# Patient Record
Sex: FEMALE | Race: BLACK OR AFRICAN AMERICAN | Employment: FULL TIME | URBAN - METROPOLITAN AREA
[De-identification: names, ages, dates, MRNs, and addresses within clinical notes are randomized per-mention and may not be internally consistent; named-entity substitution may affect disease eponyms.]

---

## 2018-09-04 ENCOUNTER — OFFICE VISIT (OUTPATIENT)
Dept: FAMILY MEDICINE CLINIC | Age: 33
End: 2018-09-04

## 2018-09-04 VITALS
HEIGHT: 67 IN | SYSTOLIC BLOOD PRESSURE: 102 MMHG | OXYGEN SATURATION: 100 % | HEART RATE: 66 BPM | DIASTOLIC BLOOD PRESSURE: 65 MMHG | RESPIRATION RATE: 18 BRPM | TEMPERATURE: 97 F | WEIGHT: 145.6 LBS | BODY MASS INDEX: 22.85 KG/M2

## 2018-09-04 DIAGNOSIS — Z11.4 SCREENING FOR HIV (HUMAN IMMUNODEFICIENCY VIRUS): ICD-10-CM

## 2018-09-04 DIAGNOSIS — R53.83 FATIGUE, UNSPECIFIED TYPE: ICD-10-CM

## 2018-09-04 DIAGNOSIS — Z76.89 ENCOUNTER TO ESTABLISH CARE: Primary | ICD-10-CM

## 2018-09-04 DIAGNOSIS — R68.82 DECREASED LIBIDO: ICD-10-CM

## 2018-09-04 DIAGNOSIS — Z11.3 SCREEN FOR STD (SEXUALLY TRANSMITTED DISEASE): ICD-10-CM

## 2018-09-04 DIAGNOSIS — M25.562 PAIN IN BOTH KNEES, UNSPECIFIED CHRONICITY: ICD-10-CM

## 2018-09-04 DIAGNOSIS — Z82.49 FAMILY HISTORY OF CARDIOMYOPATHY: ICD-10-CM

## 2018-09-04 DIAGNOSIS — M25.561 PAIN IN BOTH KNEES, UNSPECIFIED CHRONICITY: ICD-10-CM

## 2018-09-04 DIAGNOSIS — R00.2 PALPITATIONS: ICD-10-CM

## 2018-09-04 LAB
BILIRUB UR QL STRIP: NEGATIVE
GLUCOSE UR-MCNC: NEGATIVE MG/DL
HBA1C MFR BLD HPLC: 5.4 %
KETONES P FAST UR STRIP-MCNC: NEGATIVE MG/DL
PH UR STRIP: 5.5 [PH] (ref 4.6–8)
PROT UR QL STRIP: NEGATIVE
SP GR UR STRIP: 1.03 (ref 1–1.03)
UA UROBILINOGEN AMB POC: NORMAL (ref 0.2–1)
URINALYSIS CLARITY POC: CLEAR
URINALYSIS COLOR POC: YELLOW
URINE BLOOD POC: NEGATIVE
URINE LEUKOCYTES POC: NEGATIVE
URINE NITRITES POC: NEGATIVE

## 2018-09-04 NOTE — PROGRESS NOTES
Chief Complaint Patient presents with [address] [place] [address] New Patient 1. Have you been to the ER, urgent care clinic since your last visit? Hospitalized since your last visit? No 
 
2. Have you seen or consulted any other health care providers outside of the [address] [place] [address] since your last visit? Include any pap smears or colon screening. No  
 
Patient states she has been very tired lately, with no sex drive and thinning hair. Health Maintenance Due Topic Date Due  
 DTaP/Tdap/Td series (1 - Tdap) 01/05/2008  PAP AKA CERVICAL CYTOLOGY  01/05/2008  Influenza Age 5 to Adult  08/01/2018

## 2018-09-04 NOTE — MR AVS SNAPSHOT
[facility] 
 
 
 [address] [facility] [id number] [phone] Patient: Jeovanny Found MRN: [id number] NPH:[birth year] Visit Information Date & Time Provider Department Dept. Phone Encounter #  
 9/4/2018  9:30 AM Barney MoralesDrew [phone] [id number] Follow-up Instructions Return in about 1 year (around 9/4/2019), or if symptoms worsen or fail to improve. Upcoming Health Maintenance Date Due DTaP/Tdap/Td series (1 - Tdap) 1/5/2008 PAP AKA CERVICAL CYTOLOGY 1/5/2008 Influenza Age 5 to Adult 8/1/2018 Allergies as of 9/4/2018  Review Complete On: 9/4/2018 By: Barney oMrales NP No Known Allergies Current Immunizations  Never Reviewed No immunizations on file. Not reviewed this visit You Were Diagnosed With   
  
 Codes Comments Encounter to establish care    -  Primary ICD-10-CM: Z76.89 
ICD-9-CM: V65.8 Fatigue, unspecified type     ICD-10-CM: R53.83 ICD-9-CM: 780.79 Palpitations     ICD-10-CM: R00.2 ICD-9-CM: 785.1 Family history of cardiomyopathy     ICD-10-CM: Z82.49 
ICD-9-CM: V17.49 Decreased libido     ICD-10-CM: F35.50 
ICD-9-CM: 799.81 Pain in both knees, unspecified chronicity     ICD-10-CM: M25.561, M25.562 ICD-9-CM: 719.46 Screening for HIV (human immunodeficiency virus)     ICD-10-CM: Z11.4 ICD-9-CM: V73.89 Screen for STD (sexually transmitted disease)     ICD-10-CM: Z11.3 ICD-9-CM: V74.5 Vitals BP Pulse Temp Resp Height(growth percentile) Weight(growth percentile) 102/65 (BP 1 Location: Right arm, BP Patient Position: Sitting) 66 97 °F (36.1 °C) (Oral) 18 5' 7\" (1.702 m) 145 lb 9.6 oz (66 kg) LMP SpO2 BMI Smoking Status 08/15/2018 (Approximate) 100% 22.8 kg/m2 Never Smoker BMI and BSA Data Body Mass Index Body Surface Area  
 22.8 kg/m 2 1.77 m 2 Your Updated Medication List  
  
 Notice  As of 9/4/2018 10:26 AM  
 You have not been prescribed any medications. We Performed the Following AMB POC EKG ROUTINE W/ 12 LEADS, INTER & REP [28051 CPT(R)] AMB POC HEMOGLOBIN A1C [45952 CPT(R)] AMB POC URINALYSIS DIP STICK AUTO W/O MICRO [49769 CPT(R)] ANEMIA PROFILE B [id number] CPT(R)] CHLAMYDIA/GC PCR [23638 CPT(R)] HIV 1/2 AG/AB, 4TH GENERATION,W RFLX CONFIRM [id number] CPT(R)] LIPID PANEL [84880 CPT(R)] MAGNESIUM [id number] CPT(R)] METABOLIC PANEL, COMPREHENSIVE [62804 CPT(R)] TESTOSTERONE, TOTAL, FEMALE/CHILD [id number] CPT(R)] THYROID PANEL W/TSH [63862 CPT(R)] VITAMIN D, 25 HYDROXY [id number] CPT(R)] Follow-up Instructions Return in about 1 year (around 9/4/2019), or if symptoms worsen or fail to improve. Patient Instructions Fatigue: Care Instructions Your Care Instructions Fatigue is a feeling of tiredness, exhaustion, or lack of energy. You may feel fatigue because of too much or not enough activity. It can also come from stress, lack of sleep, boredom, and poor diet. Many medical problems, such as viral infections, can cause fatigue. Emotional problems, especially depression, are often the cause of fatigue. Fatigue is most often a symptom of another problem. Treatment for fatigue depends on the cause. For example, if you have fatigue because you have a certain health problem, treating this problem also treats your fatigue. If depression or anxiety is the cause, treatment may help. Follow-up care is a key part of your treatment and safety. Be sure to make and go to all appointments, and call your doctor if you are having problems. It's also a good idea to know your test results and keep a list of the medicines you take. How can you care for yourself at home? · Get regular exercise. But don't overdo it. Go back and forth between rest and exercise.  
· Get plenty of rest. 
 · Eat a healthy diet. Do not skip meals, especially breakfast. 
· Reduce your use of caffeine, tobacco, and alcohol. Caffeine is most often found in coffee, tea, cola drinks, and chocolate. · Limit medicines that can cause fatigue. This includes tranquilizers and cold and allergy medicines. When should you call for help? Watch closely for changes in your health, and be sure to contact your doctor if: 
  · You have new symptoms such as fever or a rash.  
  · Your fatigue gets worse.  
  · You have been feeling down, depressed, or hopeless. Or you may have lost interest in things that you usually enjoy.  
  · You are not getting better as expected. Where can you learn more? Go to http://demetria-jigna.info/. Enter Y577 in the search box to learn more about \"Fatigue: Care Instructions. \" Current as of: November 20, 2017 Content Version: 11.7 © 4424-0245 [employer]. Care instructions adapted under license by [employer] (which disclaims liability or warranty for this information). If you have questions about a medical condition or this instruction, always ask your healthcare professional. [place] [address] any warranty or liability for your use of this information. Decreased Libido in Women: Care Instructions Your Care Instructions A decreased libido means you have less desire to have sex. It may be hard for you to get sexually excited or have an orgasm. This is a common problem for women. Many things can cause this problem. And for a lot of women, there is more than one cause. In some cases, a woman's sex life is affected by normal changes in her body. These include having a baby and menopause. This problem can also be caused by a medicine you take. Or your vagina could be dry. Or you might have a vaginal infection. And sometimes, there are issues between you and your partner that affect your sex drive. Many women can have a healthy sex drive again after the problem is found. Your doctor may do tests to see if you have a vaginal infection. He or she may ask you questions about your sex life. For treatment to work well, it is important to trust your doctor. Be honest about your feelings toward sex. Your sex partner may want to be involved with your treatment. If you take medicine for depression, ask your doctor about changing to a medicine that does not affect sexual desire. Follow-up care is a key part of your treatment and safety. Be sure to make and go to all appointments, and call your doctor if you are having problems. It's also a good idea to know your test results and keep a list of the medicines you take. How can you care for yourself at home? · Tell your doctor about all the medicines you take. This includes vitamins, supplements, and herbal remedies. · Use vaginal lubricant during sex. Examples are Astroglide, K-Y Jelly, and Wet Gel Lubricant. · Increase the time you and your partner spend touching each other before sex. This is called foreplay. · Before sex, take a warm bath. This can relax you and reduce stress or anxiety. · Enjoy other types of sexual activity, not just intercourse. · Be honest with your sex partner about what you enjoy during sex. Where can you learn more? Go to http://demetria-jigna.info/. Enter P908 in the search box to learn more about \"Decreased Libido in Women: Care Instructions. \" Current as of: October 6, 2017 Content Version: 11.7 © 4017-1285 [employer]. Care instructions adapted under license by [employer] (which disclaims liability or warranty for this information). If you have questions about a medical condition or this instruction, always ask your healthcare professional. [place] [address] any warranty or liability for your use of this information. Introducing [facility] SERVICES! Pascale Morley introduces TiVo patient portal. Now you can access parts of your medical record, email your doctor's office, and request medication refills online. 1. In your internet browser, go to [email or web address] 2. Click on the First Time User? Click Here link in the Sign In box. You will see the New Member Sign Up page. 3. Enter your TiVo Access Code exactly as it appears below. You will not need to use this code after youve completed the sign-up process. If you do not sign up before the expiration date, you must request a new code. · TiVo Access Code: [email or web address] Expires: 12/3/2018 10:25 AM 
 
4. Enter the last four digits of your Social Security Number (xxxx) and Date of Birth (mm/dd/yyyy) as indicated and click Submit. You will be taken to the next sign-up page. 5. Create a TiVo ID. This will be your TiVo login ID and cannot be changed, so think of one that is secure and easy to remember. 6. Create a TiVo password. You can change your password at any time. 7. Enter your Password Reset Question and Answer. This can be used at a later time if you forget your password. 8. Enter your e-mail address. You will receive e-mail notification when new information is available in [address] 9. Click Sign Up. You can now view and download portions of your medical record. 10. Click the Download Summary menu link to download a portable copy of your medical information. If you have questions, please visit the Frequently Asked Questions section of the TiVo website. Remember, TiVo is NOT to be used for urgent needs. For medical emergencies, dial 911. Now available from your iPhone and Android! Please provide this summary of care documentation to your next provider. Your primary care clinician is listed as NOT ON FILE. If you have any questions after today's visit, please call [phone].

## 2018-09-04 NOTE — PATIENT INSTRUCTIONS
Fatigue: Care Instructions Your Care Instructions Fatigue is a feeling of tiredness, exhaustion, or lack of energy. You may feel fatigue because of too much or not enough activity. It can also come from stress, lack of sleep, boredom, and poor diet. Many medical problems, such as viral infections, can cause fatigue. Emotional problems, especially depression, are often the cause of fatigue. Fatigue is most often a symptom of another problem. Treatment for fatigue depends on the cause. For example, if you have fatigue because you have a certain health problem, treating this problem also treats your fatigue. If depression or anxiety is the cause, treatment may help. Follow-up care is a key part of your treatment and safety. Be sure to make and go to all appointments, and call your doctor if you are having problems. It's also a good idea to know your test results and keep a list of the medicines you take. How can you care for yourself at home? · Get regular exercise. But don't overdo it. Go back and forth between rest and exercise. · Get plenty of rest. 
· Eat a healthy diet. Do not skip meals, especially breakfast. 
· Reduce your use of caffeine, tobacco, and alcohol. Caffeine is most often found in coffee, tea, cola drinks, and chocolate. · Limit medicines that can cause fatigue. This includes tranquilizers and cold and allergy medicines. When should you call for help? Watch closely for changes in your health, and be sure to contact your doctor if: 
  · You have new symptoms such as fever or a rash.  
  · Your fatigue gets worse.  
  · You have been feeling down, depressed, or hopeless. Or you may have lost interest in things that you usually enjoy.  
  · You are not getting better as expected. Where can you learn more? Go to http://demetria-jigna.info/. Enter U250 in the search box to learn more about \"Fatigue: Care Instructions. \" Current as of: November 20, 2017 Content Version: 11.7 © 9008-2836 [employer]. Care instructions adapted under license by [employer] (which disclaims liability or warranty for this information). If you have questions about a medical condition or this instruction, always ask your healthcare professional. [address] any warranty or liability for your use of this information. Decreased Libido in Women: Care Instructions Your Care Instructions A decreased libido means you have less desire to have sex. It may be hard for you to get sexually excited or have an orgasm. This is a common problem for women. Many things can cause this problem. And for a lot of women, there is more than one cause. In some cases, a woman's sex life is affected by normal changes in her body. These include having a baby and menopause. This problem can also be caused by a medicine you take. Or your vagina could be dry. Or you might have a vaginal infection. And sometimes, there are issues between you and your partner that affect your sex drive. Many women can have a healthy sex drive again after the problem is found. Your doctor may do tests to see if you have a vaginal infection. He or she may ask you questions about your sex life. For treatment to work well, it is important to trust your doctor. Be honest about your feelings toward sex. Your sex partner may want to be involved with your treatment. If you take medicine for depression, ask your doctor about changing to a medicine that does not affect sexual desire. Follow-up care is a key part of your treatment and safety. Be sure to make and go to all appointments, and call your doctor if you are having problems. It's also a good idea to know your test results and keep a list of the medicines you take. How can you care for yourself at home? · Tell your doctor about all the medicines you take. This includes vitamins, supplements, and herbal remedies. · Use vaginal lubricant during sex. Examples are Astroglide, K-Y Jelly, and Wet Gel Lubricant. · Increase the time you and your partner spend touching each other before sex. This is called foreplay. · Before sex, take a warm bath. This can relax you and reduce stress or anxiety. · Enjoy other types of sexual activity, not just intercourse. · Be honest with your sex partner about what you enjoy during sex. Where can you learn more? Go to http://demetria-jigna.info/. Enter T272 in the search box to learn more about \"Decreased Libido in Women: Care Instructions. \" Current as of: October 6, 2017 Content Version: 11.7 © 9411-8106 [employer]. Care instructions adapted under license by [employer] (which disclaims liability or warranty for this information). If you have questions about a medical condition or this instruction, always ask your healthcare professional. [address] any warranty or liability for your use of this information.

## 2018-09-04 NOTE — PROGRESS NOTES
HISTORY OF PRESENT ILLNESS 
Haydee Moss is a 34 y.o. female. HPI  Patient comes in today to establish care. Previous PCP - none. GYN - Dr. Nenita De La Cruz. Last visit in 2016. No abnormal pap. 2 children, ages 8y son and 7y daughter Patient states she feels very tired, more than she should be for her age, thinning hair, no sex drive, even in her 19G. Still having menses, regular. States she sleeps well at night, about 6 hours nightly, states normal for her, may feel better if she gets 7 hours. Tries to drink water, maybe 2 bottles daily. Tries to eat healthy, but does eat out couple days weekly. States she has felt palpitations in past.  States she has had hand tremors in past, sometimes thinks related to coffee intake. Dry skin and hair loss. Denies weight changes, chest pains, heat or cold intolerance, constipation or diarrhea, brittle nails, fatigue. Denies any lumps in neck, neck pain. No FH of thyroid disease. Complains of bilateral knee pain, will feel a sharp pain in knee when she is climbing steps, bending knees, squats. Once she starts getting up in number of steps or squats, she will feel it. No injury to knees past or present. States she changed shoes without any improvement. States pain only last for quick second, then goes away on own. No Known Allergies Past Medical History:  
Diagnosis Date  Family history of cardiomyopathy 9/4/2018 Past Surgical History:  
Procedure Laterality Date  HX BREAST AUGMENTATION  2016  HX GYN    
 D&C Social History Social History  Marital status: SINGLE Spouse name: N/A  
 Number of children: N/A  
 Years of education: N/A Occupational History  Not on file. Social History Main Topics  Smoking status: Never Smoker  Smokeless tobacco: Never Used  Alcohol use Yes Comment: OCC  Drug use: No  
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  2 children, ages 8y son and 7y daughter. Single Family History Problem Relation Age of Onset  Pacemaker Mother  Hypertension Mother  Heart Disease Mother   
  hypertensive cardiomyopathy  Heart Attack Father  No Known Problems Brother  No Known Problems Brother No current outpatient prescriptions on file. No current facility-administered medications for this visit. Review of Systems Constitutional: Positive for malaise/fatigue. Negative for chills, diaphoresis, fever and weight loss. HENT: Negative for congestion, ear pain, sore throat and tinnitus. Eyes: Negative for blurred vision and double vision. Respiratory: Negative for cough, sputum production, shortness of breath and wheezing. Cardiovascular: Negative for chest pain, palpitations and leg swelling. Gastrointestinal: Negative for abdominal pain, blood in stool, constipation, diarrhea, nausea and vomiting. Genitourinary: Negative for dysuria, flank pain, frequency, hematuria and urgency. Musculoskeletal: Negative for back pain, joint pain and myalgias. Skin: Negative. Neurological: Negative for dizziness, tingling, sensory change, speech change, focal weakness and headaches. Psychiatric/Behavioral: Negative for depression. The patient is not nervous/anxious and does not have insomnia. Vitals:  
 09/04/18 6768 BP: 102/65 Pulse: 66 Resp: 18 Temp: 97 °F (36.1 °C) TempSrc: Oral  
SpO2: 100% Weight: 145 lb 9.6 oz (66 kg) Height: 5' 7\" (1.702 m) Physical Exam  
Constitutional: She is oriented to person, place, and time. Vital signs are normal. She appears well-developed and well-nourished. She is cooperative.   
HENT:  
Right Ear: Hearing, tympanic membrane, external ear and ear canal normal.  
 Left Ear: Hearing, tympanic membrane, external ear and ear canal normal.  
Nose: Nose normal. Right sinus exhibits no maxillary sinus tenderness and no frontal sinus tenderness. Left sinus exhibits no maxillary sinus tenderness and no frontal sinus tenderness. Mouth/Throat: Uvula is midline, oropharynx is clear and moist and mucous membranes are normal. Mucous membranes are not pale and not dry. No oropharyngeal exudate, posterior oropharyngeal edema or posterior oropharyngeal erythema. Neck: No thyroid mass and no thyromegaly present. Cardiovascular: Normal rate, regular rhythm, S1 normal, S2 normal and normal heart sounds. No murmur heard. Pulses: 
     Radial pulses are 2+ on the right side, and 2+ on the left side. Dorsalis pedis pulses are 2+ on the right side, and 2+ on the left side. Posterior tibial pulses are 2+ on the right side, and 2+ on the left side. Pulmonary/Chest: Effort normal and breath sounds normal. She has no decreased breath sounds. She has no wheezes. She has no rhonchi. She has no rales. Abdominal: Soft. Normal appearance and bowel sounds are normal. There is no hepatosplenomegaly. There is no tenderness. There is no CVA tenderness. Lymphadenopathy:  
     Head (right side): No submental, no submandibular, no tonsillar, no preauricular and no posterior auricular adenopathy present. Head (left side): No submental, no submandibular, no tonsillar, no preauricular and no posterior auricular adenopathy present. She has no cervical adenopathy. Right: No supraclavicular adenopathy present. Left: No supraclavicular adenopathy present. Neurological: She is alert and oriented to person, place, and time. Skin: Skin is warm, dry and intact. Psychiatric: She has a normal mood and affect. Her speech is normal and behavior is normal. Thought content normal.  
Vitals reviewed. ASSESSMENT and PLAN 
  ICD-10-CM ICD-9-CM 1. Encounter to establish care Z76.89 V65.8 2. Fatigue, unspecified type R53.83 780.79 ANEMIA PROFILE B  
   METABOLIC PANEL, COMPREHENSIVE  
   VITAMIN D, 25 HYDROXY  
   AMB POC HEMOGLOBIN A1C TESTOSTERONE, TOTAL, FEMALE/CHILD THYROID PANEL W/TSH AMB POC URINALYSIS DIP STICK AUTO W/O MICRO 3. Palpitations R00.2 785.1 LIPID PANEL  
   AMB POC EKG ROUTINE W/ 12 LEADS, INTER & REP  
   MAGNESIUM  
   THYROID PANEL W/TSH  
4. Family history of cardiomyopathy Z82.49 V17.49 LIPID PANEL  
   AMB POC EKG ROUTINE W/ 12 LEADS, INTER & REP 5. Decreased libido R68.82 799.81 TESTOSTERONE, TOTAL, FEMALE/CHILD 6. Pain in both knees, unspecified chronicity M25.561 719.46   
 M25.562    
7. Screening for HIV (human immunodeficiency virus) Z11.4 V73.89 HIV 1/2 AG/AB, 4TH GENERATION,W RFLX CONFIRM 8. Screen for STD (sexually transmitted disease) Z11.3 V74.5 CHLAMYDIA/GC PCR Encounter Diagnoses Name Primary?  Encounter to establish care Yes  Fatigue, unspecified type  Palpitations  Family history of cardiomyopathy  Decreased libido  Pain in both knees, unspecified chronicity  Screening for HIV (human immunodeficiency virus)  Screen for STD (sexually transmitted disease) Orders Placed This Encounter  CHLAMYDIA/GC PCR  
 ANEMIA PROFILE B  
 METABOLIC PANEL, COMPREHENSIVE  VITAMIN D, 25 HYDROXY  
 LIPID PANEL  
 TESTOSTERONE, TOTAL, FEMALE/CHILD  
 MAGNESIUM  
 THYROID PANEL W/TSH  HIV 1/2 AG/AB, 4TH GENERATION,W RFLX CONFIRM  
 AMB POC HEMOGLOBIN A1C  
 AMB POC URINALYSIS DIP STICK AUTO W/O MICRO  AMB POC EKG ROUTINE W/ 12 LEADS, INTER & REP Diagnoses and all orders for this visit: 1. Encounter to establish care 2. Fatigue, unspecified type -     ANEMIA PROFILE B 
-     METABOLIC PANEL, COMPREHENSIVE 
-     VITAMIN D, 25 HYDROXY 
-     AMB POC HEMOGLOBIN A1C 
-     TESTOSTERONE, TOTAL, FEMALE/CHILD 
-     THYROID PANEL W/TSH 
 -     AMB POC URINALYSIS DIP STICK AUTO W/O MICRO 3. Palpitations -     LIPID PANEL 
-     AMB POC EKG ROUTINE W/ 12 LEADS, INTER & REP 
-     MAGNESIUM 
-     THYROID PANEL W/TSH 
 
4. Family history of cardiomyopathy 
-     LIPID PANEL 
-     AMB POC EKG ROUTINE W/ 12 LEADS, INTER & REP 5. Decreased libido - patient is seeing someone at [facility] clinic. Needed to establish with PCP and have labs prior to returning. 
-     TESTOSTERONE, TOTAL, FEMALE/CHILD 6. Pain in both knees, unspecified chronicity - patient encouraged to perform warm up exercises prior to using stairs, squats. If continues, will need to check xray. Does not have pain on any other occasion other than steps/squats 7. Screening for HIV (human immunodeficiency virus) 
-     HIV 1/2 AG/AB, 4TH GENERATION,W RFLX CONFIRM 8. Screen for STD (sexually transmitted disease) -     CHLAMYDIA/GC PCR Follow-up Disposition: Not on File 
lab results and schedule of future lab studies reviewed with patient I have reviewed the patient's allergies and made any necessary changes. Medical, procedural, social and family histories have been reviewed and updated as medically indicated. I have reconciled and/or revised patient medications in the EMR. I have discussed each diagnosis listed in this note with Abby Browning and/or their family. I have discussed treatment options and the risk/benefit analysis of those options, including safe use of medications and possible medication side effects. Through the use of shared decision making we have agreed to the above plan. The patient has received an after-visit summary and questions were answered concerning future plans. Yara Kaur, FNP-C This note will not be viewable in [address]

## 2018-09-05 LAB
C TRACH RRNA SPEC QL NAA+PROBE: NEGATIVE
HIV 1+2 AB+HIV1 P24 AG SERPL QL IA: NON REACTIVE
N GONORRHOEA RRNA SPEC QL NAA+PROBE: NEGATIVE

## 2018-09-07 LAB
25(OH)D3+25(OH)D2 SERPL-MCNC: 18.5 NG/ML (ref 30–100)
ALBUMIN SERPL-MCNC: 4.4 G/DL (ref 3.5–5.5)
ALBUMIN/GLOB SERPL: 1.4 {RATIO} (ref 1.2–2.2)
ALP SERPL-CCNC: 62 IU/L (ref 39–117)
ALT SERPL-CCNC: 9 IU/L (ref 0–32)
AST SERPL-CCNC: 16 IU/L (ref 0–40)
BASOPHILS # BLD AUTO: 0.1 X10E3/UL (ref 0–0.2)
BASOPHILS NFR BLD AUTO: 1 %
BILIRUB SERPL-MCNC: 0.4 MG/DL (ref 0–1.2)
BUN SERPL-MCNC: 12 MG/DL (ref 6–20)
BUN/CREAT SERPL: 16 (ref 9–23)
CALCIUM SERPL-MCNC: 9.1 MG/DL (ref 8.7–10.2)
CHLORIDE SERPL-SCNC: 104 MMOL/L (ref 96–106)
CHOLEST SERPL-MCNC: 210 MG/DL (ref 100–199)
CO2 SERPL-SCNC: 21 MMOL/L (ref 20–29)
CREAT SERPL-MCNC: 0.74 MG/DL (ref 0.57–1)
EOSINOPHIL # BLD AUTO: 0.2 X10E3/UL (ref 0–0.4)
EOSINOPHIL NFR BLD AUTO: 3 %
ERYTHROCYTE [DISTWIDTH] IN BLOOD BY AUTOMATED COUNT: 14.9 % (ref 12.3–15.4)
FERRITIN SERPL-MCNC: 47 NG/ML (ref 15–150)
FOLATE SERPL-MCNC: 12 NG/ML
FT4I SERPL CALC-MCNC: 1.8 (ref 1.2–4.9)
GLOBULIN SER CALC-MCNC: 3.2 G/DL (ref 1.5–4.5)
GLUCOSE SERPL-MCNC: 86 MG/DL (ref 65–99)
HCT VFR BLD AUTO: 35.8 % (ref 34–46.6)
HDLC SERPL-MCNC: 75 MG/DL
HGB BLD-MCNC: 11.6 G/DL (ref 11.1–15.9)
IMM GRANULOCYTES # BLD: 0 X10E3/UL (ref 0–0.1)
IMM GRANULOCYTES NFR BLD: 0 %
INTERPRETATION, 910389: NORMAL
IRON SATN MFR SERPL: 23 % (ref 15–55)
IRON SERPL-MCNC: 66 UG/DL (ref 27–159)
LDLC SERPL CALC-MCNC: 121 MG/DL (ref 0–99)
LYMPHOCYTES # BLD AUTO: 2.5 X10E3/UL (ref 0.7–3.1)
LYMPHOCYTES NFR BLD AUTO: 44 %
MAGNESIUM SERPL-MCNC: 2.1 MG/DL (ref 1.6–2.3)
MCH RBC QN AUTO: 26.8 PG (ref 26.6–33)
MCHC RBC AUTO-ENTMCNC: 32.4 G/DL (ref 31.5–35.7)
MCV RBC AUTO: 83 FL (ref 79–97)
MONOCYTES # BLD AUTO: 0.4 X10E3/UL (ref 0.1–0.9)
MONOCYTES NFR BLD AUTO: 7 %
NEUTROPHILS # BLD AUTO: 2.5 X10E3/UL (ref 1.4–7)
NEUTROPHILS NFR BLD AUTO: 45 %
PLATELET # BLD AUTO: 560 X10E3/UL (ref 150–379)
POTASSIUM SERPL-SCNC: 4.5 MMOL/L (ref 3.5–5.2)
PROT SERPL-MCNC: 7.6 G/DL (ref 6–8.5)
RBC # BLD AUTO: 4.33 X10E6/UL (ref 3.77–5.28)
RETICS/RBC NFR AUTO: 0.7 % (ref 0.6–2.6)
SODIUM SERPL-SCNC: 139 MMOL/L (ref 134–144)
T3RU NFR SERPL: 25 % (ref 24–39)
T4 SERPL-MCNC: 7.3 UG/DL (ref 4.5–12)
TESTOST SERPL-MCNC: 38.4 NG/DL (ref 10–55)
TIBC SERPL-MCNC: 284 UG/DL (ref 250–450)
TRIGL SERPL-MCNC: 68 MG/DL (ref 0–149)
TSH SERPL DL<=0.005 MIU/L-ACNC: 2.56 UIU/ML (ref 0.45–4.5)
UIBC SERPL-MCNC: 218 UG/DL (ref 131–425)
VIT B12 SERPL-MCNC: 590 PG/ML (ref 232–1245)
VLDLC SERPL CALC-MCNC: 14 MG/DL (ref 5–40)
WBC # BLD AUTO: 5.6 X10E3/UL (ref 3.4–10.8)

## 2018-09-25 DIAGNOSIS — E55.9 VITAMIN D DEFICIENCY: Primary | ICD-10-CM

## 2018-09-25 RX ORDER — ERGOCALCIFEROL 1.25 MG/1
50000 CAPSULE ORAL
Qty: 4 CAP | Refills: 2 | Status: SHIPPED | OUTPATIENT
Start: 2018-09-25 | End: 2021-03-15

## 2018-09-25 NOTE — PROGRESS NOTES
RECOMMENDATIONS: 
Your platelets are elevated. Platelets are blood particles produced in the bone marrow that play an important role in the process of forming blood clots. Thrombocytosis is a disorder in which your body produces too many platelets. This can be caused by an underlying condition, such as infection, or less commonly, it has no apparent underlying condition as a cause. People with thrombocytosis often don't have signs or symptoms. People with essential thrombocythemia might have signs and symptoms related to blood clots and bleeding, including: 
Headache Dizziness or lightheadedness Chest pain Weakness Numbness or tingling of the hands and feet I would like to repeat the lab and send your blood off to the pathologist to look under microscope for abnormal cells.  If there are any abnormal cells on pathology smear, I would need to send you to see a hematologist (blood disorder doctor).  Please call office to schedule a lab only visit in the next few days. Vitamin D is low. Please fill the enclosed prescription for Vitamin D to take once weekly for 12 weeks. Once you have completed prescription, take an over-the-counter Vitamin D supplement 1,000 units daily. I have enclosed some information on Vitamin D deficiency. LDL, or bad cholesterol, is elevated. This is the cholesterol that forms plaque in your arteries that leads to stroke and heart attack. Work on diet and exercise - Try to limit the amount of fried foods, fatty foods, butter, gravy, red meat, ice cream, cheese, and eggs in your diet, which are all high in cholesterol. We can continue to monitor this. Diabetes and thyroid screening normal.  Your iron levels are normal.  HIV and STD screening negative.   Liver and kidney function normal.

## 2020-01-15 ENCOUNTER — OFFICE VISIT (OUTPATIENT)
Dept: FAMILY MEDICINE CLINIC | Age: 35
End: 2020-01-15

## 2020-01-15 VITALS
HEART RATE: 80 BPM | SYSTOLIC BLOOD PRESSURE: 129 MMHG | BODY MASS INDEX: 23.73 KG/M2 | DIASTOLIC BLOOD PRESSURE: 77 MMHG | WEIGHT: 151.2 LBS | HEIGHT: 67 IN | TEMPERATURE: 97.6 F | OXYGEN SATURATION: 100 % | RESPIRATION RATE: 16 BRPM

## 2020-01-15 DIAGNOSIS — B00.9 HSV-2 (HERPES SIMPLEX VIRUS 2) INFECTION: Primary | ICD-10-CM

## 2020-01-15 RX ORDER — VALACYCLOVIR HYDROCHLORIDE 1 G/1
1000 TABLET, FILM COATED ORAL DAILY
Qty: 90 TAB | Refills: 4 | Status: SHIPPED | OUTPATIENT
Start: 2020-01-15 | End: 2021-03-15 | Stop reason: SDUPTHER

## 2020-01-15 NOTE — PROGRESS NOTES
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     1. Have you been to the ER, urgent care clinic since your last visit? Hospitalized since your last visit? No    2. Have you seen or consulted any other health care providers outside of the [facility] since your last visit? Include any pap smears or colon screening.  No    Health Maintenance Due   Topic Date Due    DTaP/Tdap/Td series (1 - Tdap) 01/05/1998    PAP AKA CERVICAL CYTOLOGY  01/05/2008    Influenza Age 9 to Adult  08/01/2019

## 2020-01-15 NOTE — PROGRESS NOTES
HISTORY OF PRESENT ILLNESS  Denise Davidson is a 37 y.o. female. HPI  Patient comes in today for vaginal pain  Dx with HSV August 2018. Was having outbreak every 7-8 months, but recently occurs around cycle. [facility] with 3 refills. States she is having an outbreak almost every month. States she recently had another outbreak, interested in suppressive therapy.   No Known Allergies    Past Medical History:   Diagnosis Date    Family history of cardiomyopathy 9/4/2018    Vitamin D deficiency 9/25/2018       Past Surgical History:   Procedure Laterality Date    HX BREAST AUGMENTATION  2016    HX COLPOSCOPY  2006    LSIL in 2006, CIN1 on colpo    HX DILATION AND CURETTAGE         Social History     Socioeconomic History    Marital status: SINGLE     Spouse name: Not on file    Number of children: Not on file    Years of education: Not on file    Highest education level: Not on file   Occupational History    Not on file   Social Needs    Financial resource strain: Not on file    Food insecurity:     Worry: Not on file     Inability: Not on file    Transportation needs:     Medical: Not on file     Non-medical: Not on file   Tobacco Use    Smoking status: Never Smoker    Smokeless tobacco: Never Used   Substance and Sexual Activity    Alcohol use: Yes     Comment: OCC    Drug use: No    Sexual activity: Not Currently     Partners: Male     Birth control/protection: None   Lifestyle    Physical activity:     Days per week: Not on file     Minutes per session: Not on file    Stress: Not on file   Relationships    Social connections:     Talks on phone: Not on file     Gets together: Not on file     Attends [employer] service: Not on file     Active member of club or organization: Not on file     Attends meetings of clubs or organizations: Not on file     Relationship status: Not on file    Intimate partner violence:     Fear of current or ex partner: Not on file     Emotionally abused: Not on file     Physically abused: Not on file     Forced sexual activity: Not on file   Other Topics Concern    Not on file   Social History Narrative    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  2 children, ages 8y son and 7y daughter. Single        Family History   Problem Relation Age of Onset   [employer] Mother     Hypertension Mother     Heart Disease Mother         hypertensive cardiomyopathy    Heart Attack Father     No Known Problems Brother     No Known Problems Brother        Current Outpatient Medications   Medication Sig    ergocalciferol (ERGOCALCIFEROL) 50,000 unit capsule Take 1 Cap by mouth every seven (7) days. (Patient not taking: Reported on 1/15/2020)     No current facility-administered medications for this visit. Review of Systems   Genitourinary: Negative for dysuria, frequency, hematuria and urgency. Genital herpes lesions on right labia, pain and swelling in location     Vitals:    01/15/20 1515   BP: 129/77   Pulse: 80   Resp: 16   Temp: 97.6 °F (36.4 °C)   TempSrc: Oral   SpO2: 100%   Weight: 151 lb 3.2 oz (68.6 kg)   Height: 5' 7\" (1.702 m)     Physical Exam  Exam conducted with a chaperone present. Constitutional:       Appearance: Normal appearance. Cardiovascular:      Rate and Rhythm: Normal rate. Pulmonary:      Effort: Pulmonary effort is normal.   Genitourinary:     Labia:         Right: Tenderness and lesion (small healing ulcerated lesion right labia, TTP, no vesicular lesions noted) present. No rash. Left: No rash, tenderness or lesion. Neurological:      Mental Status: She is alert and oriented to person, place, and time. ASSESSMENT and PLAN    ICD-10-CM ICD-9-CM    1. HSV-2 (herpes simplex virus 2) infection B00.9 054.9 valACYclovir (VALTREX) 1 gram tablet     Encounter Diagnoses   Name Primary?     HSV-2 (herpes simplex virus 2) infection Yes     Orders Placed This Encounter    valACYclovir (VALTREX) 1 gram tablet     Diagnoses and all orders for this visit:    1. HSV-2 (herpes simplex virus 2) infection - was dx in 2018 at [facility] with viral culture. Given suppressive therapy. -     valACYclovir (VALTREX) 1 gram tablet; Take 1 Tab by mouth daily. Follow-up and Dispositions    · Return if symptoms worsen or fail to improve. I have reviewed the patient's allergies and made any necessary changes. Medical, procedural, social and family histories have been reviewed and updated as medically indicated. I have reconciled and/or revised patient medications in the EMR. I have discussed each diagnosis listed in this note with Bernardo Resendez and/or their family. I have discussed treatment options and the risk/benefit analysis of those options, including safe use of medications and possible medication side effects. Through the use of shared decision making we have agreed to the above plan. The patient has received an after-visit summary and questions were answered concerning future plans. Yara Kaur, [email or web address]    This note will not be viewable in [employer].

## 2020-04-17 ENCOUNTER — VIRTUAL VISIT (OUTPATIENT)
Dept: FAMILY MEDICINE CLINIC | Age: 35
End: 2020-04-17

## 2020-04-17 DIAGNOSIS — N90.89 BURNING SENSATION OF FEMALE PERINEUM: Primary | ICD-10-CM

## 2020-04-17 RX ORDER — AMITRIPTYLINE HYDROCHLORIDE 10 MG/1
10 TABLET, FILM COATED ORAL
Qty: 90 TAB | Refills: 1 | Status: SHIPPED | OUTPATIENT
Start: 2020-04-17 | End: 2021-03-15

## 2020-04-17 NOTE — PROGRESS NOTES
Consent: Mary Martin, who was seen by synchronous (real-time) audio-video technology, and/or her healthcare decision maker, is aware that this patient-initiated, Telehealth encounter on 4/17/2020 is a billable service, with coverage as determined by her insurance carrier. She is aware that she may receive a bill and has provided verbal consent to proceed: Yes. Assessment & Plan:   Diagnoses and all orders for this visit:    1. Burning sensation of female perineum - no active HSV lesions that patient has seen. No vaginal complaints. Symptoms only located in pelvic region and upper thighs. ?neuropathic problem related to HSV. Will try amitriptyline. If no improvement, can consider switching antivirals. Can also consider obtaining labs outpt. Patient to trial amitriptyline for 2 weeks, call if no improvement. Discussed side effects of medication. -     amitriptyline (ELAVIL) 10 mg tablet; Take 1 Tab by mouth nightly. I spent at least 10 minutes with this established patient, and >50% of the time was spent counseling and/or coordinating care regarding HSV, burning sensation in pelvic area, upper thighs  712  Subjective:   Mary Martin is a 37 y.o. female who was seen for Medication Evaluation (valtrex not working)    Has been taking Valtrex daily for 4 months now for HSV prevention. .  Still feels like she is feeling things in body, like a burning sensation in thighs, bottom of buttock and stomach, legs. States sensations are not painful. Describes as a warming sensation. No warming sensation in genital area, no outbreaks in genital region. During her first outbreak had a tingling sensation. No redness. No previous HSV lesions in this area    Prior to Admission medications    Medication Sig Start Date End Date Taking? Authorizing Provider   valACYclovir (VALTREX) 1 gram tablet Take 1 Tab by mouth daily.  1/15/20  Yes Lydia Kaur, NP   ergocalciferol (ERGOCALCIFEROL) 50,000 unit capsule Take 1 Cap by mouth every seven (7) days. 9/25/18  Yes Charmaine Kaur NP     No Known Allergies    Patient Active Problem List   Diagnosis Code    Family history of cardiomyopathy Z80.55    Vitamin D deficiency E55.9     Current Outpatient Medications   Medication Sig Dispense Refill    valACYclovir (VALTREX) 1 gram tablet Take 1 Tab by mouth daily. 90 Tab 4    ergocalciferol (ERGOCALCIFEROL) 50,000 unit capsule Take 1 Cap by mouth every seven (7) days. 4 Cap 2     No Known Allergies  Past Medical History:   Diagnosis Date    Family history of cardiomyopathy 9/4/2018    HSV-2 infection 08/2018    Vitamin D deficiency 9/25/2018     Social History     Tobacco Use    Smoking status: Never Smoker    Smokeless tobacco: Never Used   Substance Use Topics    Alcohol use: Yes     Comment: OCC       Review of Systems   Constitutional: Negative for chills and fever. Gastrointestinal: Negative for abdominal pain, nausea and vomiting. Genitourinary: Negative for dysuria, frequency and urgency. Burning sensation in lower abd/pelvis, upper thighs, buttocks. Normal physiologic vaginal discharge, no change from normal   Skin: Negative for itching and rash. Neurological: Negative for tingling, sensory change, speech change and focal weakness. Objective: There were no vitals taken for this visit. General: alert, cooperative, no distress   Mental  status: normal mood, behavior, speech, dress, motor activity, and thought processes, able to follow commands   HENT: NCAT   Neck: no visualized mass   Resp: no respiratory distress   Neuro: no gross deficits   Skin: no discoloration or lesions of concern on visible areas   Psychiatric: normal affect, consistent with stated mood, no evidence of hallucinations     Additional exam findings: We discussed the expected course, resolution and complications of the diagnosis(es) in detail.   Medication risks, benefits, costs, interactions, and alternatives were discussed as indicated. I advised her to contact the office if her condition worsens, changes or fails to improve as anticipated. She expressed understanding with the diagnosis(es) and plan. Neida Conti is a 37 y.o. female being evaluated by a video visit encounter for concerns as above. A caregiver was present when appropriate. Due to this being a TeleHealth encounter (During [facility]-14 public health emergency), evaluation of the following organ systems was limited: Vitals/Constitutional/EENT/Resp/CV/GI//MS/Neuro/Skin/Heme-Lymph-Imm. Pursuant to the emergency declaration under the [facility], 1135 waiver authority and the [employer] and [employer] Act, this Virtual  Visit was conducted, with patient's (and/or legal guardian's) consent, to reduce the patient's risk of exposure to COVID-19 and provide necessary medical care. Services were provided through a video synchronous discussion virtually to substitute for in-person clinic visit. Patient and provider were located at their individual homes.         Lloyd Santacruz NP

## 2021-03-15 ENCOUNTER — VIRTUAL VISIT (OUTPATIENT)
Dept: FAMILY MEDICINE CLINIC | Age: 36
End: 2021-03-15
Payer: COMMERCIAL

## 2021-03-15 DIAGNOSIS — Z71.85 VACCINE COUNSELING: ICD-10-CM

## 2021-03-15 DIAGNOSIS — B00.9 HSV-2 (HERPES SIMPLEX VIRUS 2) INFECTION: Primary | ICD-10-CM

## 2021-03-15 PROCEDURE — 99213 OFFICE O/P EST LOW 20 MIN: CPT | Performed by: NURSE PRACTITIONER

## 2021-03-15 RX ORDER — VALACYCLOVIR HYDROCHLORIDE 1 G/1
1000 TABLET, FILM COATED ORAL DAILY
Qty: 90 TAB | Refills: 4 | Status: SHIPPED | OUTPATIENT
Start: 2021-03-15 | End: 2022-04-07

## 2021-03-15 RX ORDER — BISMUTH SUBSALICYLATE 262 MG
1 TABLET,CHEWABLE ORAL EVERY OTHER DAY
COMMUNITY

## 2021-03-15 RX ORDER — MELATONIN
EVERY OTHER DAY
COMMUNITY

## 2021-03-15 NOTE — PROGRESS NOTES
Kassi Diamond (: 1985) is a 31 y.o. female, established patient, here for evaluation of the following chief complaint(s):   Sexually Transmitted Disease       ASSESSMENT/PLAN:  1. HSV-2 (herpes simplex virus 2) infection - doing well on suppresive therapy. Refilled medication.  -     valACYclovir (VALTREX) 1 gram tablet; Take 1 Tab by mouth daily. , Normal, Disp-90 Tab, R-4  2. Vaccine counseling - discussed COVID vaccines with patient, encouraged patient to get vaccine    Patient to schedule pap with me or GYN in near future. Return in about 1 year (around 3/15/2022), or if symptoms worsen or fail to improve. SUBJECTIVE/OBJECTIVE:  HPI  Patient is seen virtually for follow up HSV    Dx with HSV 2018. She had been doing suppressive therapy which worked well, but recently ran out of medication for a while and had outbreak. Had recent episode due to this. Discussed COVID vaccine with patient.     Patient to schedule pap  TNo Known Allergies    Past Medical History:   Diagnosis Date    Family history of cardiomyopathy 2018    HSV-2 infection 2018    Vitamin D deficiency 2018       Past Surgical History:   Procedure Laterality Date    HX BREAST AUGMENTATION  2016    HX COLPOSCOPY  2006    LSIL in 2006, CIN1 on colpo    HX DILATION AND CURETTAGE         Social History     Socioeconomic History    Marital status: SINGLE     Spouse name: Not on file    Number of children: Not on file    Years of education: Not on file    Highest education level: Not on file   Occupational History    Not on file   Social Needs    Financial resource strain: Not on file    Food insecurity     Worry: Not on file     Inability: Not on file    Transportation needs     Medical: Not on file     Non-medical: Not on file   Tobacco Use    Smoking status: Never Smoker    Smokeless tobacco: Never Used   Substance and Sexual Activity    Alcohol use: Yes     Comment: OCC    Drug use: No    Sexual activity: Not Currently     Partners: Male     Birth control/protection: None   Lifestyle    Physical activity     Days per week: Not on file     Minutes per session: Not on file    Stress: Not on file   Relationships    Social connections     Talks on phone: Not on file     Gets together: Not on file     Attends [employer] service: Not on file     Active member of club or organization: Not on file     Attends meetings of clubs or organizations: Not on file     Relationship status: Not on file    Intimate partner violence     Fear of current or ex partner: Not on file     Emotionally abused: Not on file     Physically abused: Not on file     Forced sexual activity: Not on file   Other Topics Concern    Not on file   Social History Narrative    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  2 children, ages 13y son and 8y daughter. Single        Family History   Problem Relation Age of Onset   [employer] Mother     Hypertension Mother     Heart Disease Mother         hypertensive cardiomyopathy    Heart Attack Father     No Known Problems Brother     No Known Problems Brother        Current Outpatient Medications   Medication Sig    multivitamin (ONE A DAY) tablet Take 1 Tab by mouth daily.  cholecalciferol (VITAMIN D3) (1000 Units /25 mcg) tablet Take  by mouth daily.  valACYclovir (VALTREX) 1 gram tablet Take 1 Tab by mouth daily.  amitriptyline (ELAVIL) 10 mg tablet Take 1 Tab by mouth nightly. (Patient not taking: Reported on 3/15/2021)    ergocalciferol (ERGOCALCIFEROL) 50,000 unit capsule Take 1 Cap by mouth every seven (7) days. (Patient not taking: Reported on 3/15/2021)     No current facility-administered medications for this visit. Review of Systems   Constitutional: Negative for chills, fatigue and fever. Respiratory: Negative for cough and shortness of breath. Cardiovascular: Negative for chest pain and palpitations.    Gastrointestinal: Negative for abdominal pain, nausea and vomiting. Genitourinary: Negative for dysuria, flank pain, frequency, hematuria and urgency. Hx HSV   Skin: Negative. Neurological: Negative for dizziness and headaches. Psychiatric/Behavioral: Negative for dysphoric mood and sleep disturbance. The patient is not nervous/anxious.          Patient-Reported Vitals 3/15/2021   Patient-Reported Weight 155   Patient-Reported Height 5'7   Patient-Reported Temperature 97.9   Patient-Reported LMP 3/3/2021       Physical Exam    [INSTRUCTIONS:  \"[x]\" Indicates a positive item  \"[]\" Indicates a negative item  -- DELETE ALL ITEMS NOT EXAMINED]    Constitutional: [x] Appears well-developed and well-nourished [x] No apparent distress      [] Abnormal -     Mental status: [x] Alert and awake  [x] Oriented to person/place/time [x] Able to follow commands    [] Abnormal -     Eyes:   EOM    [x]  Normal    [] Abnormal -   Sclera  [x]  Normal    [] Abnormal -          Discharge [x]  None visible   [] Abnormal -     HENT: [x] Normocephalic, atraumatic  [] Abnormal -   [x] Mouth/Throat: Mucous membranes are moist    External Ears [x] Normal  [] Abnormal -    Neck: [x] No visualized mass [] Abnormal -     Pulmonary/Chest: [x] Respiratory effort normal   [x] No visualized signs of difficulty breathing or respiratory distress        [] Abnormal -      Musculoskeletal:   [x] Normal gait with no signs of ataxia         [x] Normal range of motion of neck        [] Abnormal -     Neurological:        [x] No Facial Asymmetry (Cranial nerve 7 motor function) (limited exam due to video visit)          [x] No gaze palsy        [] Abnormal -          Skin:        [x] No significant exanthematous lesions or discoloration noted on facial skin         [] Abnormal -            Psychiatric:       [x] Normal Affect [] Abnormal -        [x] No Hallucinations    Other pertinent observable physical exam findings:- none    On this date 03/15/2021 I have spent 20 minutes reviewing previous notes, test results and face to face (virtual) with the patient discussing the diagnosis and importance of compliance with the treatment plan as well as documenting on the day of the visit. Mary Martin was evaluated through a synchronous (real-time) audio-video encounter. The patient (or guardian if applicable) is aware that this is a billable service. Verbal consent to proceed has been obtained within the past 12 months. The visit was conducted pursuant to the emergency declaration under the [address] [place] [address] and the [employer] Act. Patient identification was verified, and a caregiver was present when appropriate. The patient was located in a state where the provider was credentialed to provide care. An electronic signature was used to authenticate this note.   -- Silver Malone NP

## 2021-07-29 NOTE — PROGRESS NOTES
Chief Complaint   Patient presents with    Sexually Transmitted Disease       1. Have you been to the ER, urgent care clinic since your last visit? Hospitalized since your last visit? No    2. Have you seen or consulted any other health care providers outside of the [address] [place] [address] since your last visit? Include any pap smears or colon screening.  No     PAP - Pt aware overdue  Flu shot - Pt got that this season    Health Maintenance Due   Topic Date Due    Hepatitis C Screening  Never done    DTaP/Tdap/Td series (1 - Tdap) Never done    PAP AKA CERVICAL CYTOLOGY  Never done    Flu Vaccine (1) 09/01/2020 Never

## 2021-08-09 ENCOUNTER — PATIENT MESSAGE (OUTPATIENT)
Dept: FAMILY MEDICINE CLINIC | Age: 36
End: 2021-08-09

## 2021-08-09 ENCOUNTER — TELEPHONE (OUTPATIENT)
Dept: FAMILY MEDICINE CLINIC | Age: 36
End: 2021-08-09

## 2021-08-09 NOTE — TELEPHONE ENCOUNTER
----- Message from Tee Copeland sent at 8/9/2021  8:39 AM EDT -----  Regarding: Vincent/telephone  Pt is requesting an appointment for a pain in her right breast.Pts number is [phone].

## 2021-08-12 ENCOUNTER — OFFICE VISIT (OUTPATIENT)
Dept: FAMILY MEDICINE CLINIC | Age: 36
End: 2021-08-12
Payer: COMMERCIAL

## 2021-08-12 VITALS
OXYGEN SATURATION: 100 % | SYSTOLIC BLOOD PRESSURE: 111 MMHG | RESPIRATION RATE: 12 BRPM | BODY MASS INDEX: 25.49 KG/M2 | TEMPERATURE: 97.7 F | HEIGHT: 67 IN | WEIGHT: 162.4 LBS | DIASTOLIC BLOOD PRESSURE: 69 MMHG | HEART RATE: 77 BPM

## 2021-08-12 DIAGNOSIS — Z80.3 FAMILY HISTORY OF BREAST CANCER: ICD-10-CM

## 2021-08-12 DIAGNOSIS — N64.4 BREAST PAIN, RIGHT: Primary | ICD-10-CM

## 2021-08-12 DIAGNOSIS — Z98.82 HISTORY OF BILATERAL BREAST IMPLANTS: ICD-10-CM

## 2021-08-12 PROCEDURE — 99213 OFFICE O/P EST LOW 20 MIN: CPT | Performed by: NURSE PRACTITIONER

## 2021-08-12 NOTE — TELEPHONE ENCOUNTER
Nancy Ibarra LPN 6/37/7405 02:89 AM EDT      ----- Message -----  From: Linda Araiza  Sent: 8/12/2021 11:24 AM EDT  To: [facility]  Subject: RE: Non-Urgent Medical Question     Montrell Berrios,    [facility] needs an order for a bilateral diagnostic mammogram. The  said I would need to have both sides examine with just an ultrasound on the right side. She said you can fax the order to . Once she gets the order she will contact me to schedule the appointment. The number for breast imaging is  just in case. Thanks.

## 2021-08-12 NOTE — PROGRESS NOTES
Chief Complaint   Patient presents with    Breast pain     right; few months     8/9/21 8:59 AM  Montrell Lunsford,     Do I need to be referred by my primary care to get a mammogram? I would also like to be seen in person by you or one of your colleagues soon if possible. I have been having mild pain in my right breast. I usually sleep on my right side and just attributed the discomfort to that but last week I just happen to feel all over the breast and notice some spots that felt questionable. I haven't experienced any discomfort on my left side and I dont feel any questionable spots on it. Breast cancer does run in my family as my maternal grandmother had it. 1. Have you been to the ER, urgent care clinic since your last visit? Hospitalized since your last visit? No    2. Have you seen or consulted any other health care providers outside of the [address] [place] [address] since your last visit? Include any pap smears or colon screening.  No    Health Maintenance Due   Topic Date Due    Hepatitis C Screening  Never done    COVID-19 Vaccine (1) Never done    PAP AKA CERVICAL CYTOLOGY  Never done

## 2021-08-12 NOTE — PATIENT INSTRUCTIONS
Breast Pain: Care Instructions  Your Care Instructions     Breast tenderness and pain may come and go with your monthly periods (cyclic), or it may not follow any pattern (noncyclic). Breast pain is rarely caused by a serious health problem. You may need tests to find the cause. Follow-up care is a key part of your treatment and safety. Be sure to make and go to all appointments, and call your doctor if you are having problems. It's also a good idea to know your test results and keep a list of the medicines you take. How can you care for yourself at home? · If your doctor gave you medicine, take it exactly as prescribed. Call your doctor if you think you are having a problem with your medicine. · Take an over-the-counter pain medicine, such as acetaminophen (Tylenol), ibuprofen (Advil, Motrin), or naproxen (Aleve), to relieve pain and swelling. Read and follow all instructions on the label. · Do not take two or more pain medicines at the same time unless the doctor told you to. Many pain medicines have acetaminophen, which is Tylenol. Too much acetaminophen (Tylenol) can be harmful. · Wear a supportive bra, such as a sports bra or a jog bra. · Cut down on the amount of fat in your diet. If you need help planning healthy meals, see a dietitian. · Get at least 30 minutes of exercise on most days of the week. Walking is a good choice. You also may want to do other activities, such as running, swimming, cycling, or playing tennis or team sports. · Keep a healthy sleep pattern. Go to bed at the same time every night, and get up at the same time every day. When should you call for help? Call your doctor now or seek immediate medical care if:    · You have new changes in a breast, such as:  ? A lump or thickening in your breast or armpit. ? A change in the breast's size or shape. ? Skin changes, such as dimples or puckers. ? Nipple discharge.   ? A change in the color or feel of the skin of your breast or the darker area around the nipple (areola). ? A change in the shape of the nipple (it may look like it's being pulled into the breast).     · You have symptoms of a breast infection, such as:  ? Increased pain, swelling, redness, or warmth around a breast.  ? Red streaks extending from the breast.  ? Pus draining from a breast.  ? A fever. Watch closely for changes in your health, and be sure to contact your doctor if:    · Your breast pain does not get better after 1 week.     · You have a lump or thickening in your breast or armpit. Where can you learn more? Go to http://www.gray.com/  Enter Z395 in the search box to learn more about \"Breast Pain: Care Instructions. \"  Current as of: February 26, 2020               Content Version: 12.8  © 6941-3926 Healthwise, [employer]orporated. Care instructions adapted under license by [employer] (which disclaims liability or warranty for this information). If you have questions about a medical condition or this instruction, always ask your healthcare professional. [address] any warranty or liability for your use of this information.

## 2021-08-12 NOTE — PROGRESS NOTES
Leticia Avila (: 1985) is a 31 y.o. female, established patient, here for evaluation of the following chief complaint(s):  Breast pain (right; few months)       ASSESSMENT/PLAN:  Below is the assessment and plan developed based on review of pertinent history, physical exam, labs, studies, and medications. 1. Breast pain, right - small subcentimeter lumps noted around right areola, ?scar tissue vs cystic changes vs mass. Will check dx mammo and US  -     FIDENCIO MAMMO BILAT DX INCL CAD; Future  -     US BREAST RT COMPLETE 4 QUAD; Future  2. Family history of breast cancer  -     FIDENCIO MAMMO BILAT DX INCL CAD; Future  -     US BREAST RT COMPLETE 4 QUAD; Future  3. History of bilateral breast implants      Return if symptoms worsen or fail to improve. SUBJECTIVE/OBJECTIVE:  HPI  Patient comes in today for breast pain    Pain is located in right breat, lateral side. She sleeps on side mostly. Area would feel tender upon wakening. Pain comes and goes. Not daily. May hurt 3-4 times weekly. Has implants, done in 2016. Last week, could not sleep because of pain. Felt an area beside nipple, felt like a bump, felt different than left nipple. Recently has been trying to sleep on back, but still has discomfort. No personal hx of breast cancer  MGM had breast cancer (postmenopausal), maternal aunt had breast cancer (not sure if premenopausal)  Typically wears supportive bras during day. No exercise regularly. No caffeine. Does not eat may fast food meals, greasy meals. No herbal supplements/OTC vitamins.     No Known Allergies    Past Medical History:   Diagnosis Date    Family history of cardiomyopathy 2018    HSV-2 infection 2018    Vitamin D deficiency 2018       Past Surgical History:   Procedure Laterality Date    HX BREAST AUGMENTATION  2016    HX COLPOSCOPY  2006    LSIL in 2006, CIN1 on colpo    HX DILATION AND CURETTAGE         Social History     Socioeconomic History    Marital status: SINGLE     Spouse name: Not on file    Number of children: Not on file    Years of education: Not on file    Highest education level: Not on file   Occupational History    Not on file   Tobacco Use    Smoking status: Never Smoker    Smokeless tobacco: Never Used   Vaping Use    Vaping Use: Never used   Substance and Sexual Activity    Alcohol use: Yes     Comment: OCC    Drug use: No    Sexual activity: Not Currently     Partners: Male     Birth control/protection: None   Other Topics Concern    Not on file   Social History Narrative    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  2 children, ages 13y son and 8y daughter. Single      Social Determinants of Health     Financial Resource Strain:     Difficulty of Paying Living Expenses:    Food Insecurity:     Worried About Running Out of Food in the Last Year:     [address] in the Last Year:    Transportation Needs:     Lack of Transportation (Medical):      Lack of Transportation (Non-Medical):    Physical Activity:     Days of Exercise per Week:     Minutes of Exercise per Session:    Stress:     Feeling of Stress :    Social Connections:     Frequency of Communication with Friends and Family:     Frequency of Social Gatherings with Friends and Family:     Attends Sabianist Services:     Active Member of Clubs or Organizations:     Attends Club or Organization Meetings:     Marital Status:    Intimate Partner Violence:     Fear of Current or Ex-Partner:     Emotionally Abused:     Physically Abused:     Sexually Abused:        Family History   Problem Relation Age of Onset    Pacemaker Mother     Hypertension Mother     Heart Disease Mother         hypertensive cardiomyopathy    Heart Attack Father     No Known Problems Brother     No Known Problems Brother        Current Outpatient Medications   Medication Sig    multivitamin (ONE A DAY) tablet Take 1 Tablet by mouth every other day.    cholecalciferol (VITAMIN D3) (1000 Units /25 mcg) tablet Take  by mouth every other day.  valACYclovir (VALTREX) 1 gram tablet Take 1 Tab by mouth daily. No current facility-administered medications for this visit. Review of Systems   Constitutional: Negative for chills, fever and unexpected weight change. Respiratory: Negative. Cardiovascular: Negative. Genitourinary:        Right breast pain     Vitals:    08/12/21 0933   BP: 111/69   Pulse: 77   Resp: 12   Temp: 97.7 °F (36.5 °C)   TempSrc: Oral   SpO2: 100%   Weight: 162 lb 6.4 oz (73.7 kg)   Height: 5' 7\" (1.702 m)     Physical Exam  Vitals reviewed. Constitutional:       Appearance: Normal appearance. She is well-developed, well-groomed and normal weight. Cardiovascular:      Rate and Rhythm: Normal rate. Pulmonary:      Effort: Pulmonary effort is normal.   Chest:      Breasts:         Right: Mass (small subcentimeter lumps noted around right areola, ?scar tissue vs cyst) and tenderness present. No swelling, bleeding, inverted nipple, nipple discharge or skin change. Left: No swelling, bleeding, inverted nipple, mass, nipple discharge, skin change or tenderness. Comments: Implants bilateral breasts  Skin:     General: Skin is warm and dry. Neurological:      Mental Status: She is alert and oriented to person, place, and time. Psychiatric:         Behavior: Behavior is cooperative. On this date 08/12/2021 I have spent 25 minutes reviewing previous notes, test results and face to face with the patient discussing the diagnosis and importance of compliance with the treatment plan as well as documenting on the day of the visit. An electronic signature was used to authenticate this note.   -- Marialuisa Arthur NP

## 2021-09-03 ENCOUNTER — TELEPHONE (OUTPATIENT)
Dept: FAMILY MEDICINE CLINIC | Age: 36
End: 2021-09-03

## 2021-09-22 ENCOUNTER — TELEPHONE (OUTPATIENT)
Dept: FAMILY MEDICINE CLINIC | Age: 36
End: 2021-09-22

## 2021-09-22 DIAGNOSIS — N63.0 BREAST MASS: Primary | ICD-10-CM

## 2021-09-22 NOTE — TELEPHONE ENCOUNTER
[facility] called requesting a R breast w/ axilla US.  Pended to SPENSER Kaur

## 2021-09-22 NOTE — TELEPHONE ENCOUNTER
Order completed. Orders Placed This Encounter    US BREAST AXILLA RT     Standing Status:   Future     Standing Expiration Date:   10/22/2022     Order Specific Question:   Is Patient Pregnant?      Answer:   No     Order Specific Question:   Reason for Exam     Answer:   breast mass

## 2021-09-30 DIAGNOSIS — Z80.3 FAMILY HISTORY OF BREAST CANCER: ICD-10-CM

## 2021-09-30 DIAGNOSIS — N64.4 BREAST PAIN, RIGHT: ICD-10-CM

## 2021-09-30 DIAGNOSIS — Z98.82 HISTORY OF BILATERAL BREAST IMPLANTS: ICD-10-CM

## 2021-10-27 ENCOUNTER — OFFICE VISIT (OUTPATIENT)
Dept: FAMILY MEDICINE CLINIC | Age: 36
End: 2021-10-27
Payer: COMMERCIAL

## 2021-10-27 VITALS
OXYGEN SATURATION: 100 % | HEIGHT: 67 IN | SYSTOLIC BLOOD PRESSURE: 112 MMHG | HEART RATE: 85 BPM | DIASTOLIC BLOOD PRESSURE: 74 MMHG | RESPIRATION RATE: 12 BRPM | BODY MASS INDEX: 25.46 KG/M2 | TEMPERATURE: 97.4 F | WEIGHT: 162.2 LBS

## 2021-10-27 DIAGNOSIS — Z23 NEEDS FLU SHOT: ICD-10-CM

## 2021-10-27 DIAGNOSIS — M79.621 AXILLARY PAIN, RIGHT: Primary | ICD-10-CM

## 2021-10-27 DIAGNOSIS — M25.511 ACUTE PAIN OF RIGHT SHOULDER: ICD-10-CM

## 2021-10-27 PROCEDURE — 90686 IIV4 VACC NO PRSV 0.5 ML IM: CPT | Performed by: NURSE PRACTITIONER

## 2021-10-27 PROCEDURE — 90471 IMMUNIZATION ADMIN: CPT | Performed by: NURSE PRACTITIONER

## 2021-10-27 PROCEDURE — 99213 OFFICE O/P EST LOW 20 MIN: CPT | Performed by: NURSE PRACTITIONER

## 2021-10-27 NOTE — PROGRESS NOTES
Chief Complaint   Patient presents with    Arm Pain     10/24/21 : I have been having right arm pain. The pains seem to originate under the armpit and back shoulder and it's impacting my range of motion and limiting my ability to hold weight. I can no longer  my work bookbag without it hurting, or other other medium items. The pain in tolerable but it seems to be traveling down my arm. I've been feeling this for about a month.         1. Have you been to the ER, urgent care clinic since your last visit? Hospitalized since your last visit? No    2. Have you seen or consulted any other health care providers outside of the [facility] [place] [address] since your last visit? Include any pap smears or colon screening. No     Flu shot - Pt accepts  Verbal Order with Readback given by Melecio Frazier NP for Influenza. Given in Right Deltoid without difficulty.      Health Maintenance Due   Topic Date Due    Hepatitis C Screening  Never done    Cervical cancer screen  Never done    Flu Vaccine (1) 09/01/2021

## 2021-10-27 NOTE — PROGRESS NOTES
Flavia Bosch (: 1985) is a 31 y.o. female, established patient, here for evaluation of the following chief complaint(s):  Arm Pain       ASSESSMENT/PLAN:  Below is the assessment and plan developed based on review of pertinent history, physical exam, labs, studies, and medications. 1. Axillary pain, right - due to have right breast and right axilla US at [facility] next week. Will await results. -     XR SHOULDER RT AP/LAT MIN 2 V; Future  2. Acute pain of right shoulder - check xray. Can take OTC Aleve 1-2 tabs BID prn pain. -     XR SHOULDER RT AP/LAT MIN 2 V; Future  3. Needs flu shot  -     INFLUENZA VIRUS VAC QUAD,SPLIT,PRESV FREE SYRINGE IM      Return if symptoms worsen or fail to improve. SUBJECTIVE/OBJECTIVE:  HPI patient comes in today for right axillary pain    Per patient email on 10/24/21:  \"The pains seem to originate under the armpit and back shoulder and it's impacting my range of motion and limiting my ability to hold weight. I can no longer  my work bookbag without it hurting, or other other medium items.  The pain in tolerable but it seems to be traveling down my arm. I've been feeling this for about a month. \"    Adalberto James started about 6-8 weeks ago. With certain movements, feels a sharp pain. If she picks up bookbag, will increase pain. Lifting arm above head, feels a pulling and sharp pain jorge l arm and in armpit. Does not recall injury  She recently started exercising. Noticed 5 pound weight was hurting arm  Trying to sleep on other side, trying not to use right arm  She was seen in Aug for right breast pain. [facility] did not do breast US or right axilla US. She is scheduled next week for that. Only taking tylenol for pain with minimal relief.   No Known Allergies    Past Medical History:   Diagnosis Date    Family history of cardiomyopathy 2018    HSV-2 infection 2018    Vitamin D deficiency 2018       Past Surgical History:   Procedure Laterality Date  HX BREAST AUGMENTATION  2016    HX COLPOSCOPY  2006    LSIL in 2006, CIN1 on colpo    HX DILATION AND CURETTAGE         Social History     Socioeconomic History    Marital status: SINGLE     Spouse name: Not on file    Number of children: Not on file    Years of education: Not on file    Highest education level: Not on file   Occupational History    Not on file   Tobacco Use    Smoking status: Never Smoker    Smokeless tobacco: Never Used   Vaping Use    Vaping Use: Never used   Substance and Sexual Activity    Alcohol use: Yes     Comment: OCC    Drug use: No    Sexual activity: Not Currently     Partners: Male     Birth control/protection: None   Other Topics Concern    Not on file   Social History Narrative    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  2 children, ages 13y son and 8y daughter. Single      Social Determinants of Health     Financial Resource Strain:     Difficulty of Paying Living Expenses:    Food Insecurity:     Worried About Running Out of Food in the Last Year:     [address] in the Last Year:    Transportation Needs:     Lack of Transportation (Medical):      Lack of Transportation (Non-Medical):    Physical Activity:     Days of Exercise per Week:     Minutes of Exercise per Session:    Stress:     Feeling of Stress :    Social Connections:     Frequency of Communication with Friends and Family:     Frequency of Social Gatherings with Friends and Family:     Attends Pentecostalism Services:     Active Member of Clubs or Organizations:     Attends Club or Organization Meetings:     Marital Status:    Intimate Partner Violence:     Fear of Current or Ex-Partner:     Emotionally Abused:     Physically Abused:     Sexually Abused:        Family History   Problem Relation Age of Onset    Pacemaker Mother     Hypertension Mother     Heart Disease Mother         hypertensive cardiomyopathy    Heart Attack Father     No Known Problems Brother     No Known Problems Brother        Current Outpatient Medications   Medication Sig    multivitamin (ONE A DAY) tablet Take 1 Tablet by mouth every other day.  cholecalciferol (VITAMIN D3) (1000 Units /25 mcg) tablet Take  by mouth every other day.  valACYclovir (VALTREX) 1 gram tablet Take 1 Tab by mouth daily. No current facility-administered medications for this visit. Review of Systems   Constitutional: Negative for chills, fatigue and fever. Respiratory: Negative. Cardiovascular: Negative. Musculoskeletal: Positive for myalgias. No breast pain. Pain located in right axillary region, radiates into shoulder and down right upper arm     Vitals:    10/27/21 0949   BP: 112/74   Pulse: 85   Resp: 12   Temp: 97.4 °F (36.3 °C)   TempSrc: Oral   SpO2: 100%   Weight: 162 lb 3.2 oz (73.6 kg)   Height: 5' 7\" (1.702 m)     Physical Exam  Vitals reviewed. Constitutional:       Appearance: Normal appearance. She is well-developed, well-groomed and normal weight. HENT:      Right Ear: Hearing normal.      Left Ear: Hearing normal.   Cardiovascular:      Rate and Rhythm: Normal rate. Pulses:           Radial pulses are 2+ on the right side. Pulmonary:      Effort: Pulmonary effort is normal.   Musculoskeletal:      Right shoulder: No swelling, deformity, effusion, tenderness, bony tenderness or crepitus. Normal range of motion. Normal strength. Normal pulse. Comments: Negative sarahy kimball   Lymphadenopathy:      Upper Body:      Right upper body: No axillary adenopathy. Skin:     General: Skin is warm and dry. Neurological:      Mental Status: She is alert and oriented to person, place, and time. Psychiatric:         Attention and Perception: Attention normal.         Mood and Affect: Affect normal.         Speech: Speech normal.         Behavior: Behavior is cooperative.          Cognition and Memory: Cognition and memory normal.       On this date 10/27/2021 I have spent 25 minutes reviewing previous notes, test results and face to face with the patient discussing the diagnosis and importance of compliance with the treatment plan as well as documenting on the day of the visit. An electronic signature was used to authenticate this note.   -- Vinnie Raymond, NP

## 2021-10-27 NOTE — PATIENT INSTRUCTIONS
Vaccine Information Statement    Influenza (Flu) Vaccine (Inactivated or Recombinant): What You Need to Know    Many vaccine information statements are available in Yoruba and other languages. See www.immunize.org/vis. Hojas de información sobre vacunas están disponibles en español y en muchos otros idiomas. Visite www.immunize.org/vis. 1. Why get vaccinated? Influenza vaccine can prevent influenza (flu). Flu is a contagious disease that spreads around the [facility] every year, usually between October and May. Anyone can get the flu, but it is more dangerous for some people. Infants and young children, people 72 years and older, pregnant people, and people with certain health conditions or a weakened immune system are at greatest risk of flu complications. Pneumonia, bronchitis, sinus infections, and ear infections are examples of flu-related complications. If you have a medical condition, such as heart disease, cancer, or diabetes, flu can make it worse. Flu can cause fever and chills, sore throat, muscle aches, fatigue, cough, headache, and runny or stuffy nose. Some people may have vomiting and diarrhea, though this is more common in children than adults. In an average year, thousands of people in the [employer] die from flu, and many more are hospitalized. Flu vaccine prevents millions of illnesses and flu-related visits to the doctor each year. 2. Influenza vaccines     CDC recommends everyone 6 months and older get vaccinated every flu season. Children 6 months through 6years of age may need 2 doses during a single flu season. Everyone else needs only 1 dose each flu season. It takes about 2 weeks for protection to develop after vaccination. There are many flu viruses, and they are always changing. Each year a new flu vaccine is made to protect against the influenza viruses believed to be likely to cause disease in the upcoming flu season.  Even when the vaccine doesnt exactly match these viruses, it may still provide some protection. Influenza vaccine does not cause flu. Influenza vaccine may be given at the same time as other vaccines. 3. Talk with your health care provider    Tell your vaccination provider if the person getting the vaccine:   Has had an allergic reaction after a previous dose of influenza vaccine, or has any severe, life-threatening allergies    Has ever had Guillain-Barré Syndrome (also called GBS)    In some cases, your health care provider may decide to postpone influenza vaccination until a future visit. Influenza vaccine can be administered at any time during pregnancy. People who are or will be pregnant during influenza season should receive inactivated influenza vaccine. People with minor illnesses, such as a cold, may be vaccinated. People who are moderately or severely ill should usually wait until they recover before getting influenza vaccine. Your health care provider can give you more information. 4. Risks of a vaccine reaction     Soreness, redness, and swelling where the shot is given, fever, muscle aches, and headache can happen after influenza vaccination.  There may be a very small increased risk of Guillain-Barré Syndrome (GBS) after inactivated influenza vaccine (the flu shot). [facility] children who get the flu shot along with pneumococcal vaccine (PCV13) and/or DTaP vaccine at the same time might be slightly more likely to have a seizure caused by fever. Tell your health care provider if a child who is getting flu vaccine has ever had a seizure. People sometimes faint after medical procedures, including vaccination. Tell your provider if you feel dizzy or have vision changes or ringing in the ears. As with any medicine, there is a very remote chance of a vaccine causing a severe allergic reaction, other serious injury, or death. 5. What if there is a serious problem?     An allergic reaction could occur after the vaccinated person leaves the clinic. If you see signs of a severe allergic reaction (hives, swelling of the face and throat, difficulty breathing, a fast heartbeat, dizziness, or weakness), call 9-1-1 and get the person to the nearest hospital.    For other signs that concern you, call your health care provider. Adverse reactions should be reported to the Vaccine Adverse Event Reporting System (VAERS). Your health care provider will usually file this report, or you can do it yourself. Visit the VAERS website at www.vaers. [facility].gov or call 0-783.462.8249. VAERS is only for reporting reactions, and VAERS staff members do not give medical advice. 6. The National Vaccine Injury Compensation Program    The [facility] Vaccine Injury Compensation Program (VICP) is a federal program that was created to compensate people who may have been injured by certain vaccines. Claims regarding alleged injury or death due to vaccination have a time limit for filing, which may be as short as two years. Visit the VICP website at www.[facility].gov/vaccinecompensation or call 6-867.695.7072 to learn about the program and about filing a claim. 7. How can I learn more?  Ask your health care provider.  Call your local or state health department.  Visit the website of the Food and Drug Administration (FDA) for vaccine package inserts and additional information at www.fda.gov/vaccines-blood-biologics/vaccines.  Contact the Centers for Disease Control and Prevention (CDC):  - Call 0-335.529.8662 (1-800-CDC-INFO) or  - Visit CDCs influenza website at www.cdc.gov/flu. Vaccine Information Statement   Inactivated Influenza Vaccine   8/6/2021  42 NANCY Irizarry [id number]-[id number]   Department of Health and Human Services  Centers for Disease Control and Prevention    Office Use Only

## 2022-01-12 ENCOUNTER — TELEPHONE (OUTPATIENT)
Dept: FAMILY MEDICINE CLINIC | Age: 37
End: 2022-01-12

## 2022-01-12 NOTE — TELEPHONE ENCOUNTER
Carrie([facility]) is requesting the prior auth to be faxed back to them [id number]

## 2022-01-14 NOTE — TELEPHONE ENCOUNTER
No information on what PA is for and no call back number. Left Message for patient to return call to office.

## 2022-03-18 PROBLEM — E55.9 VITAMIN D DEFICIENCY: Status: ACTIVE | Noted: 2018-09-25

## 2022-03-19 PROBLEM — Z82.49 FAMILY HISTORY OF CARDIOMYOPATHY: Status: ACTIVE | Noted: 2018-09-04

## 2022-03-19 PROBLEM — Z98.82 HISTORY OF BILATERAL BREAST IMPLANTS: Status: ACTIVE | Noted: 2021-08-12

## 2022-04-06 DIAGNOSIS — B00.9 HSV-2 (HERPES SIMPLEX VIRUS 2) INFECTION: ICD-10-CM

## 2022-04-07 RX ORDER — VALACYCLOVIR HYDROCHLORIDE 1 G/1
TABLET, FILM COATED ORAL
Qty: 90 TABLET | Refills: 4 | Status: SHIPPED | OUTPATIENT
Start: 2022-04-07